# Patient Record
Sex: FEMALE | Race: BLACK OR AFRICAN AMERICAN | Employment: UNEMPLOYED
[De-identification: names, ages, dates, MRNs, and addresses within clinical notes are randomized per-mention and may not be internally consistent; named-entity substitution may affect disease eponyms.]

---

## 2023-02-15 ENCOUNTER — HOSPITAL ENCOUNTER (EMERGENCY)
Facility: HOSPITAL | Age: 33
Discharge: LEFT BEFORE DISCHARGE | End: 2023-02-15
Attending: EMERGENCY MEDICINE

## 2023-02-15 ENCOUNTER — HOSPITAL ENCOUNTER (EMERGENCY)
Facility: HOSPITAL | Age: 33
Discharge: LEFT BEFORE DISCHARGE | End: 2023-02-15
Attending: EMERGENCY MEDICINE
Payer: MEDICAID

## 2023-02-15 VITALS
SYSTOLIC BLOOD PRESSURE: 115 MMHG | DIASTOLIC BLOOD PRESSURE: 77 MMHG | RESPIRATION RATE: 18 BRPM | OXYGEN SATURATION: 99 % | HEART RATE: 57 BPM

## 2023-02-15 DIAGNOSIS — T14.91XA SUICIDE ATTEMPT (HCC): Primary | ICD-10-CM

## 2023-02-15 LAB
ALBUMIN SERPL-MCNC: 3.6 G/DL (ref 3.4–5)
ALBUMIN/GLOB SERPL: 0.9 (ref 0.8–1.7)
ALP SERPL-CCNC: 51 U/L (ref 45–117)
ALT SERPL-CCNC: 18 U/L (ref 13–56)
ANION GAP SERPL CALC-SCNC: 2 MMOL/L (ref 3–18)
APAP SERPL-MCNC: <2 UG/ML (ref 10–30)
AST SERPL-CCNC: 12 U/L (ref 10–38)
BASOPHILS # BLD: 0 K/UL (ref 0–0.1)
BASOPHILS NFR BLD: 1 % (ref 0–2)
BILIRUB DIRECT SERPL-MCNC: <0.1 MG/DL (ref 0–0.2)
BILIRUB SERPL-MCNC: 0.3 MG/DL (ref 0.2–1)
BUN SERPL-MCNC: 16 MG/DL (ref 7–18)
BUN/CREAT SERPL: 16 (ref 12–20)
CALCIUM SERPL-MCNC: 8.8 MG/DL (ref 8.5–10.1)
CHLORIDE SERPL-SCNC: 106 MMOL/L (ref 100–111)
CO2 SERPL-SCNC: 26 MMOL/L (ref 21–32)
CREAT SERPL-MCNC: 0.99 MG/DL (ref 0.6–1.3)
DIFFERENTIAL METHOD BLD: NORMAL
EKG ATRIAL RATE: 56 BPM
EKG DIAGNOSIS: NORMAL
EKG P-R INTERVAL: 120 MS
EKG Q-T INTERVAL: 434 MS
EKG QRS DURATION: 72 MS
EKG QTC CALCULATION (BAZETT): 418 MS
EKG R AXIS: 20 DEGREES
EKG T AXIS: 21 DEGREES
EKG VENTRICULAR RATE: 56 BPM
EOSINOPHIL # BLD: 0.2 K/UL (ref 0–0.4)
EOSINOPHIL NFR BLD: 3 % (ref 0–5)
ERYTHROCYTE [DISTWIDTH] IN BLOOD BY AUTOMATED COUNT: 12 % (ref 11.6–14.5)
GLOBULIN SER CALC-MCNC: 3.8 G/DL (ref 2–4)
GLUCOSE SERPL-MCNC: 137 MG/DL (ref 74–99)
HCG SERPL QL: NEGATIVE
HCT VFR BLD AUTO: 39.4 % (ref 35–45)
HGB BLD-MCNC: 13.3 G/DL (ref 12–16)
IMM GRANULOCYTES # BLD AUTO: 0 K/UL (ref 0–0.04)
IMM GRANULOCYTES NFR BLD AUTO: 0 % (ref 0–0.5)
LIPASE SERPL-CCNC: 84 U/L (ref 73–393)
LYMPHOCYTES # BLD: 2.2 K/UL (ref 0.9–3.6)
LYMPHOCYTES NFR BLD: 35 % (ref 21–52)
MAGNESIUM SERPL-MCNC: 2 MG/DL (ref 1.6–2.6)
MCH RBC QN AUTO: 30.1 PG (ref 24–34)
MCHC RBC AUTO-ENTMCNC: 33.8 G/DL (ref 31–37)
MCV RBC AUTO: 89.1 FL (ref 78–100)
MONOCYTES # BLD: 0.4 K/UL (ref 0.05–1.2)
MONOCYTES NFR BLD: 7 % (ref 3–10)
NEUTS SEG # BLD: 3.5 K/UL (ref 1.8–8)
NEUTS SEG NFR BLD: 55 % (ref 40–73)
NRBC # BLD: 0 K/UL (ref 0–0.01)
NRBC BLD-RTO: 0 PER 100 WBC
PLATELET # BLD AUTO: 272 K/UL (ref 135–420)
PMV BLD AUTO: 10.6 FL (ref 9.2–11.8)
POTASSIUM SERPL-SCNC: 4 MMOL/L (ref 3.5–5.5)
PROT SERPL-MCNC: 7.4 G/DL (ref 6.4–8.2)
RBC # BLD AUTO: 4.42 M/UL (ref 4.2–5.3)
SALICYLATES SERPL-MCNC: 3.3 MG/DL (ref 2.8–20)
SODIUM SERPL-SCNC: 134 MMOL/L (ref 136–145)
WBC # BLD AUTO: 6.3 K/UL (ref 4.6–13.2)

## 2023-02-15 PROCEDURE — 83735 ASSAY OF MAGNESIUM: CPT

## 2023-02-15 PROCEDURE — 85025 COMPLETE CBC W/AUTO DIFF WBC: CPT

## 2023-02-15 PROCEDURE — 80048 BASIC METABOLIC PNL TOTAL CA: CPT

## 2023-02-15 PROCEDURE — 80179 DRUG ASSAY SALICYLATE: CPT

## 2023-02-15 PROCEDURE — 99283 EMERGENCY DEPT VISIT LOW MDM: CPT

## 2023-02-15 PROCEDURE — 80076 HEPATIC FUNCTION PANEL: CPT

## 2023-02-15 PROCEDURE — 84703 CHORIONIC GONADOTROPIN ASSAY: CPT

## 2023-02-15 PROCEDURE — 80143 DRUG ASSAY ACETAMINOPHEN: CPT

## 2023-02-15 PROCEDURE — 93005 ELECTROCARDIOGRAM TRACING: CPT | Performed by: PHYSICIAN ASSISTANT

## 2023-02-15 PROCEDURE — 83690 ASSAY OF LIPASE: CPT

## 2023-02-15 PROCEDURE — 6370000000 HC RX 637 (ALT 250 FOR IP): Performed by: PHYSICIAN ASSISTANT

## 2023-02-15 PROCEDURE — 99284 EMERGENCY DEPT VISIT MOD MDM: CPT

## 2023-02-15 RX ORDER — ACTIVATED CHARCOAL 208 MG/ML
50 SUSPENSION ORAL ONCE
Status: COMPLETED | OUTPATIENT
Start: 2023-02-15 | End: 2023-02-15

## 2023-02-15 RX ADMIN — POISON ADSORBENT 50 G: 50 SUSPENSION ORAL at 12:26

## 2023-02-15 ASSESSMENT — ENCOUNTER SYMPTOMS
SHORTNESS OF BREATH: 0
ABDOMINAL PAIN: 0
VOMITING: 0
NAUSEA: 0
ABDOMINAL PAIN: 0
SHORTNESS OF BREATH: 0
NAUSEA: 0
VOMITING: 0

## 2023-02-15 NOTE — ED NOTES
Non-emergent Khurram   And JEREMIAS Kim called and reported per policy.       Martha Butcher RN  02/15/23 2053

## 2023-02-15 NOTE — ED NOTES
EMERGENCY DEPARTMENT HISTORY AND PHYSICAL EXAM    12:22 PM      Date: (Not on file)  Patient Name: Mira Mccloud    History of Presenting Illness     Chief Complaint   Patient presents with    Drug Overdose         History Provided By: Patient, EMS     Additional History (Context): Mira Mccloud is a 29 y.o. female with {No past medical history on file. } who presents via EMS due to suicide attempt. Pt notes ~11am she took 12-20 tablets of Clonidine 0.1mg and Loratadine 10mg tablets. EMS has prescription bottles. Pt notes medication was not hers. When asked, pt denies SI. Denies HI, visual or auditory hallucinations. Denies alcohol or other drug ingestion. Pt immediately got off the EMS stretcher and eloped. Charge nurse notified Clyde MCDOWELL.     PCP: No primary care provider on file. No current facility-administered medications for this encounter. No current outpatient medications on file. Past History     Past Medical History:  No past medical history on file. Past Surgical History:  No past surgical history on file. Family History:  No family history on file. Social History: Allergies:  No Known Allergies      Review of Systems       Review of Systems   Constitutional:  Negative for chills and fever. Respiratory:  Negative for shortness of breath. Cardiovascular:  Negative for chest pain. Gastrointestinal:  Negative for abdominal pain, nausea and vomiting. Skin:  Negative for rash. Psychiatric/Behavioral:  Positive for agitation, behavioral problems, dysphoric mood and suicidal ideas. All other systems reviewed and are negative. Physical Exam   There were no vitals taken for this visit. Physical Exam  Vitals and nursing note reviewed. Constitutional:       General: She is not in acute distress. Appearance: Normal appearance. She is not ill-appearing, toxic-appearing or diaphoretic. HENT:      Head: Normocephalic and atraumatic. Cardiovascular:      Rate and Rhythm: Normal rate and regular rhythm. Pulmonary:      Effort: Pulmonary effort is normal.      Breath sounds: Normal breath sounds. Musculoskeletal:         General: Normal range of motion. Cervical back: Normal range of motion and neck supple. Skin:     General: Skin is warm. Findings: No rash. Neurological:      General: No focal deficit present. Mental Status: She is alert and oriented to person, place, and time. Cranial Nerves: No cranial nerve deficit. Sensory: No sensory deficit. Motor: No weakness. Psychiatric:         Attention and Perception: She is inattentive. Mood and Affect: Affect is blunt and flat. Speech: Speech is delayed. Behavior: Behavior is uncooperative and withdrawn. Thought Content: Thought content includes suicidal ideation. Thought content includes suicidal plan. Diagnostic Study Results     Labs -  No results found for this or any previous visit (from the past 12 hour(s)). Radiologic Studies -   No orders to display         Medical Decision Making   I am the first provider for this patient. I reviewed the vital signs, available nursing notes, past medical history, past surgical history, family history and social history. Vital Signs-Reviewed the patient's vital signs. Records Reviewed: Nursing Notes and Old Medical Records (Time of Review: 12:22 PM)    ED Course: Progress Notes, Reevaluation, and Consults:  11:22 AM: [facility] immediately notified of patient. Diagnosis     Clinical Impression:   1. Suicide attempt [facility])        Disposition: eloped       No follow-up provider specified. Medication List      You have not been prescribed any medications. Dictation disclaimer:  Please note that this dictation was completed with [employer], the [employer] voice recognition software.   Quite often unanticipated grammatical, syntax, homophones, and other interpretive errors are inadvertently transcribed by the computer software. Please disregard these errors. Please excuse any errors that have escaped final proofreading.          [place]  [place]  02/15/23 5834

## 2023-02-15 NOTE — ED NOTES
EMERGENCY DEPARTMENT HISTORY AND PHYSICAL EXAM    2:52 PM      Date: 2/15/2023  Patient Name: Maurilio Galvan    History of Presenting Illness     Chief Complaint   Patient presents with    Drug Overdose    Suicidal       History Provided By: Patient, father    Additional History (Context): Maurilio Gavlan is a 29 y.o. female with No past medical history on file. who presents with father due to suicide attempt. Pt notes she took between 12-20 tablets of Clonidine 0.1 mg tablets and Loratadine 10mg tabs at ~11:00 AM in an attempt \"to sleep and not wake up\". Pt denies HI, visual or auditory hallucinations. Pt denies alcohol or drug ingestion. Father has text messages from patient that note she does not want to be here anymore. Denies hx of previous attempt in the past.     PCP: No primary care provider on file. No current facility-administered medications for this encounter. No current outpatient medications on file. Past History     Past Medical History:  No past medical history on file. Past Surgical History:  No past surgical history on file. Family History:  No family history on file. Social History: Allergies:  No Known Allergies      Review of Systems       Review of Systems   Constitutional:  Negative for chills and fever. Respiratory:  Negative for shortness of breath. Cardiovascular:  Negative for chest pain. Gastrointestinal:  Negative for abdominal pain, nausea and vomiting. Skin:  Negative for rash. Psychiatric/Behavioral:  Positive for agitation, behavioral problems, dysphoric mood and suicidal ideas. The patient is not nervous/anxious. All other systems reviewed and are negative. Physical Exam   /77   Pulse 57   Resp 18   SpO2 99%       Physical Exam  Vitals and nursing note reviewed. Constitutional:       General: She is not in acute distress. Appearance: Normal appearance. She is not ill-appearing, toxic-appearing or diaphoretic. HENT:      Head: Normocephalic and atraumatic. Cardiovascular:      Rate and Rhythm: Normal rate and regular rhythm. Pulmonary:      Effort: Pulmonary effort is normal.      Breath sounds: Normal breath sounds. Musculoskeletal:         General: Normal range of motion. Cervical back: Normal range of motion and neck supple. Skin:     General: Skin is warm. Findings: No rash. Neurological:      General: No focal deficit present. Mental Status: She is alert and oriented to person, place, and time. Cranial Nerves: No cranial nerve deficit. Sensory: No sensory deficit. Motor: No weakness. Psychiatric:         Attention and Perception: She is inattentive. Mood and Affect: Affect is blunt and flat. Behavior: Behavior is withdrawn. Thought Content: Thought content includes suicidal ideation. Thought content does not include homicidal ideation. Thought content includes suicidal plan.          Diagnostic Study Results     Labs -  Recent Results (from the past 12 hour(s))   EKG 12 Lead    Collection Time: 02/15/23 12:16 PM   Result Value Ref Range    Ventricular Rate 56 BPM    Atrial Rate 56 BPM    P-R Interval 120 ms    QRS Duration 72 ms    Q-T Interval 434 ms    QTc Calculation (Bazett) 418 ms    R Axis 20 degrees    T Axis 21 degrees    Diagnosis Sinus bradycardia    CBC with Auto Differential    Collection Time: 02/15/23 12:25 PM   Result Value Ref Range    WBC 6.3 4.6 - 13.2 K/uL    RBC 4.42 4.20 - 5.30 M/uL    Hemoglobin 13.3 12.0 - 16.0 g/dL    Hematocrit 39.4 35.0 - 45.0 %    MCV 89.1 78.0 - 100.0 FL    MCH 30.1 24.0 - 34.0 PG    MCHC 33.8 31.0 - 37.0 g/dL    RDW 12.0 11.6 - 14.5 %    Platelets 665 221 - 060 K/uL    MPV 10.6 9.2 - 11.8 FL    Nucleated RBCs 0.0 0  WBC    nRBC 0.00 0.00 - 0.01 K/uL    Seg Neutrophils 55 40 - 73 %    Lymphocytes 35 21 - 52 %    Monocytes 7 3 - 10 %    Eosinophils % 3 0 - 5 %    Basophils 1 0 - 2 %    Immature Granulocytes 0 0.0 - 0.5 %    Segs Absolute 3.5 1.8 - 8.0 K/UL    Absolute Lymph # 2.2 0.9 - 3.6 K/UL    Absolute Mono # 0.4 0.05 - 1.2 K/UL    Absolute Eos # 0.2 0.0 - 0.4 K/UL    Basophils Absolute 0.0 0.0 - 0.1 K/UL    Absolute Immature Granulocyte 0.0 0.00 - 0.04 K/UL    Differential Type AUTOMATED     Basic Metabolic Panel    Collection Time: 02/15/23 12:25 PM   Result Value Ref Range    Sodium 134 (L) 136 - 145 mmol/L    Potassium 4.0 3.5 - 5.5 mmol/L    Chloride 106 100 - 111 mmol/L    CO2 26 21 - 32 mmol/L    Anion Gap 2 (L) 3.0 - 18 mmol/L    Glucose 137 (H) 74 - 99 mg/dL    BUN 16 7.0 - 18 MG/DL    Creatinine 0.99 0.6 - 1.3 MG/DL    Bun/Cre Ratio 16 12 - 20      Est, Glom Filt Rate >60 >60 ml/min/1.73m2    Calcium 8.8 8.5 - 10.1 MG/DL   HCG Qualitative, Serum    Collection Time: 02/15/23 12:25 PM   Result Value Ref Range    HCG, Ql. Negative NEG     Salicylate    Collection Time: 02/15/23 12:25 PM   Result Value Ref Range    Salicylate, Serum 3.3 2.8 - 20.0 MG/DL   Hepatic Function Panel    Collection Time: 02/15/23 12:25 PM   Result Value Ref Range    Total Protein 7.4 6.4 - 8.2 g/dL    Albumin 3.6 3.4 - 5.0 g/dL    Globulin 3.8 2.0 - 4.0 g/dL    Albumin/Globulin Ratio 0.9 0.8 - 1.7      Total Bilirubin 0.3 0.2 - 1.0 MG/DL    Bilirubin, Direct <0.1 0.0 - 0.2 MG/DL    Alk Phosphatase 51 45 - 117 U/L    AST 12 10 - 38 U/L    ALT 18 13 - 56 U/L   Acetaminophen Level    Collection Time: 02/15/23 12:25 PM   Result Value Ref Range    Acetaminophen Level <2 (L) 10.0 - 30.0 ug/mL   Lipase    Collection Time: 02/15/23 12:25 PM   Result Value Ref Range    Lipase 84 73 - 393 U/L   Magnesium    Collection Time: 02/15/23 12:25 PM   Result Value Ref Range    Magnesium 2.0 1.6 - 2.6 mg/dL       Radiologic Studies -   No orders to display         Medical Decision Making   I am the first provider for this patient.     I reviewed the vital signs, available nursing notes, past medical history, past surgical history, family history and social history. Vital Signs-Reviewed the patient's vital signs. Records Reviewed: Prior medical records, Previous radiology studies, Previous laboratory studies, Previous EKGs, Nursing notes, and EMS run sheet(Time of Review: 2:52 PM)    ED Course: Progress Notes, Reevaluation, and Consults:  12:05 PM: [facility] spoke with intake nurse Pratima Pichardo. Recommends Narcan up to 10mg as needed, Atropine as needed for bradycardia, observation x 6 hours, activated charcoal.   12:30 PM: Activated charcoal administered by nurse Sharon Rangel. Pending bed assignment, discussed with charge nurse. 1:45 PM: Notified by Naheed liu, that patient eloped. [facility] was notified. Police were notified. Diagnosis     Clinical Impression:   1. Suicide attempt [facility])      Disposition: eloped     No follow-up provider specified. Medication List      You have not been prescribed any medications. Dictation disclaimer:  Please note that this dictation was completed with [employer], the [employer] voice recognition software. Quite often unanticipated grammatical, syntax, homophones, and other interpretive errors are inadvertently transcribed by the computer software. Please disregard these errors. Please excuse any errors that have escaped final proofreading.        Dudley Leivama  02/15/23 3201

## 2023-02-15 NOTE — ED NOTES
Received patient from home via ems for taking 12 total pills of Claritin and Klonopin. Upon arrival, patient denies SI and is demanding to leave.       Daniel Mccoy RN  02/15/23 1123

## 2023-02-15 NOTE — ED TRIAGE NOTES
Client reports taking clonidin and zyrtec this am, at least 20 tabs. Client report wanting to go to sleep, not wanting to be here at 1000. Client drowsy, father at bedside.

## 2023-02-15 NOTE — BSMART NOTE
Behavioral Health Crisis Assessment    Chief Complaint: \"Wanting to just go to sleep\"        Voluntary or Involuntary Status:       C-SSRS current suicide Risk (High, Moderate, Low): High Risk      Past Suicide Attempts (specify): Patient denies. Self Injurious/Self Mutilation behaviors (specify): Patient denies      Protective Factors (specify): Family      Risk Factors (specify): \"Life in general.\"       Substance use (current or past): Patient admits to drinking. [facility] & Substance use Treatment  (current and/or past):Patient reports adolescent inpatient treatment for Bipolar Disorder. Violence towards others (current and/or past:(specify):\"I can be a little violent at time. \"      Legal issues (current or past): Patient denies. Access to weapons: Patient denies. Trauma or Abuse: (specify): Patient denies. Living Situation: Patient currently lives with her father. Employment: Patient reports working at [employer].       Education level: 12 th grade education. Mental Status: Patient is a  29year old  female. Patient was drowsy and had to be waken up several time throughout the assessment. Patient appeared younger than than stated age, wearing gold sweater and gold pants. Patient had a stupper  posture, Fleeting eye contact and presented with guarded and defensive attitude, Depressed and Irritable  mood and depressed and defensive affect. Thought process was Clear with grandious content. Memory was appropriate. Patient's speech was pressured at times. Judgement is Inability to understand mental illness/need for treatment/hospitalization and insight is impaired due to patient minimizing the severity of her actions. Brief Clinical Summary: Patient is 37-year- old [employer] female. Patient was brought to hospital via EMS.  Per triage note, \"Received patient from home via ems for taking 12 total pills of Claritin and Klonopin. Upon arrival, patient denies SI and is demanding to leave. \" Patient is vaguely endorsing suicidal ideations. \"You can do a lot of something and if the timing is wrong its wrong but if the timing is right its right. \" Patient denying homicidal ideations, auditory and visual hallucinations. Patient interview was conducted face to face. Patient with a history of Bipolar disorder and childhood acute psychiatric inpatient stays, was brought to this treatment facility via ambulance after she consumed 10-20 pills. Patient reports feel \"tired and just wanting to go to sleep. Patient reports working two jobs with long hours. Patient informed crisis she gets 1-2 hours of sleep most days. Patient also informed crisis she recently fund out her storage that all her personal belongings in them was recently auctioned off. Patient informed crisis she thought her mother was paying the bill and it ends up she was not. Patient reports that was all her and her kids possessions in the world. Patient reports feeling hurt. \"This is aint the first time Shu Hurtado has done something like this. \" Patient was very tearful during assessment. Patient reports being off medications for 10 plus years and not currently seeing a provider for individual therapy. Disposition: Patient eloped out of emergency room. JAYME Aburto and [facility] made aware. [facility] emergency police contacted.